# Patient Record
Sex: FEMALE | URBAN - METROPOLITAN AREA
[De-identification: names, ages, dates, MRNs, and addresses within clinical notes are randomized per-mention and may not be internally consistent; named-entity substitution may affect disease eponyms.]

---

## 2022-09-08 ENCOUNTER — OFFICE VISIT (OUTPATIENT)
Dept: URBAN - METROPOLITAN AREA CLINIC 8 | Facility: CLINIC | Age: 87
End: 2022-09-08
Payer: COMMERCIAL

## 2022-09-08 DIAGNOSIS — H27.111 SUBLUXATION OF LENS, RIGHT EYE: ICD-10-CM

## 2022-09-08 DIAGNOSIS — H25.12 AGE-RELATED NUCLEAR CATARACT, LEFT EYE: ICD-10-CM

## 2022-09-08 DIAGNOSIS — H26.491 OTHER SECONDARY CATARACT, RIGHT EYE: Primary | ICD-10-CM

## 2022-09-08 PROCEDURE — 99204 OFFICE O/P NEW MOD 45 MIN: CPT | Performed by: OPHTHALMOLOGY

## 2022-09-08 ASSESSMENT — INTRAOCULAR PRESSURE
OD: 17
OS: 17

## 2022-09-08 ASSESSMENT — VISUAL ACUITY
OS: 20/40
OD: 20/60

## 2022-09-08 NOTE — IMPRESSION/PLAN
Impression: Age-related nuclear cataract, left eye: H25.12. Plan: Dense cataract OS. Patient defers cataract surgery at this time.

## 2022-09-08 NOTE — IMPRESSION/PLAN
Impression: Other secondary cataract, right eye: H26.491. Plan: Discussed findings with patient. The risks benefits and alternatives to laser capsulotomy were discussed with the patient. The patient concurred with YAG laser capsulotomy.  Schedule YAG laser tx OD

## 2022-09-08 NOTE — IMPRESSION/PLAN
Impression: Other secondary cataract, right eye: H26.491. Plan: had ECCE OD would likely need same OS due to very brunescent cataract.  has mild inf subluxation OD. much pco OD. plan is to do YAG OD>